# Patient Record
Sex: FEMALE | Race: WHITE | URBAN - METROPOLITAN AREA
[De-identification: names, ages, dates, MRNs, and addresses within clinical notes are randomized per-mention and may not be internally consistent; named-entity substitution may affect disease eponyms.]

---

## 2020-08-06 ENCOUNTER — APPOINTMENT (RX ONLY)
Dept: URBAN - METROPOLITAN AREA CLINIC 40 | Facility: CLINIC | Age: 70
Setting detail: DERMATOLOGY
End: 2020-08-06

## 2020-08-06 DIAGNOSIS — L82.0 INFLAMED SEBORRHEIC KERATOSIS: ICD-10-CM

## 2020-08-06 PROCEDURE — ? LIQUID NITROGEN

## 2020-08-06 PROCEDURE — ? COUNSELING

## 2020-08-06 PROCEDURE — 17110 DESTRUCTION B9 LES UP TO 14: CPT

## 2020-08-06 PROCEDURE — ? ADDITIONAL NOTES

## 2020-08-06 ASSESSMENT — LOCATION ZONE DERM: LOCATION ZONE: SCALP

## 2020-08-06 ASSESSMENT — LOCATION DETAILED DESCRIPTION DERM: LOCATION DETAILED: LEFT CENTRAL FRONTAL SCALP

## 2020-08-06 ASSESSMENT — LOCATION SIMPLE DESCRIPTION DERM: LOCATION SIMPLE: SCALP

## 2024-06-06 ENCOUNTER — OFFICE (OUTPATIENT)
Dept: URBAN - METROPOLITAN AREA CLINIC 79 | Facility: CLINIC | Age: 74
End: 2024-06-06
Payer: MEDICARE

## 2024-06-06 VITALS
WEIGHT: 192 LBS | SYSTOLIC BLOOD PRESSURE: 130 MMHG | HEIGHT: 59 IN | DIASTOLIC BLOOD PRESSURE: 100 MMHG | HEART RATE: 92 BPM | TEMPERATURE: 97.6 F

## 2024-06-06 DIAGNOSIS — R15.0 INCOMPLETE DEFECATION: ICD-10-CM

## 2024-06-06 DIAGNOSIS — K62.5 HEMORRHAGE OF ANUS AND RECTUM: ICD-10-CM

## 2024-06-06 DIAGNOSIS — R19.8 OTHER SPECIFIED SYMPTOMS AND SIGNS INVOLVING THE DIGESTIVE S: ICD-10-CM

## 2024-06-06 DIAGNOSIS — R13.10 DYSPHAGIA, UNSPECIFIED: ICD-10-CM

## 2024-06-06 PROCEDURE — 99204 OFFICE O/P NEW MOD 45 MIN: CPT | Performed by: INTERNAL MEDICINE

## 2024-06-06 NOTE — SERVICEHPINOTES
SASCHA ZELAYA   is a   74   year old    female who is being seen in consultation at the request of   NITISH MCARTHUR   for trouble with bowel movements. She has a history of hysterectomy in her 30s, bladder surgery (pelvic sling) in her 40s, and pelvic floor support surgery for prolapse of rectum (done by colorectal surgery)  She had a mesh put in at that time to keep her bowels in place. She has been having issues defecating. She notes sensation to defecate but then has trouble getting stool out. She notes BSS type 1 frequently. She can note long thin stools at times as well. She notes bladder leakage frequently. She can note stool leakage at times. She denies any blood in stool but notes blood on toilet paper at times. She attributes this to hemorrhoids (since her pregnancy). She had a CT which revealed colonic diverticulosis and very small hiatal hernia.  She denies any melena, She denies any nausea or vomiting. She can note occasional mild LLQ discomfort at times. She denies loss of appetite or weight loss. She denies any reflux but can note occasional dysphagia. No family history of colon cancer.  She had a flex sig in her 30s. She had two colonoscopies around time of her pelvic floor support surgery (done by [facility]). No history of colon polyps. 
br
br Of note she has a history of occasional heartbeat irregularity but has seen cardiology twice with no significant findings. Her shortness of breath on exertion is usually if she has to walk faster to keep up with somebody else. There has been no change in this. She does have a history of sleep apnea.

## 2024-06-06 NOTE — SERVICENOTES
Patient understands and agrees with plan. Questions/concerns addressed.

I personally discussed the procedures with the patient, including the risks, benefits, and alternatives of EGD and Colonoscopy. The procedures will be done under pharmacologic sedation. Risks and potential complications of the procedures include, but not limited to: bleeding, infection, bowel perforation, adverse reaction to anesthetics and missed lesions. Biopsy, dilation, polypectomy and/or maneuvers to control bleeding may be performed.  Discussed the need for low fiber diet for a week before procedure and clear liquid diet the day before procedure. 
Discussed prep with Sutab or Miralax with Gatorade. Patient expressed understanding of all these risks and was given the opportunity to ask questions, which I answered to the patient’s satisfaction. The patient agreed to the procedures.

## 2024-07-22 ENCOUNTER — OFFICE (OUTPATIENT)
Dept: URBAN - METROPOLITAN AREA PATHOLOGY 3 | Facility: PATHOLOGY | Age: 74
End: 2024-07-22
Payer: MEDICARE

## 2024-07-22 ENCOUNTER — AMBULATORY SURGICAL CENTER (OUTPATIENT)
Dept: URBAN - METROPOLITAN AREA SURGERY 23 | Facility: SURGERY | Age: 74
End: 2024-07-22
Payer: MEDICARE

## 2024-07-22 DIAGNOSIS — K64.0 FIRST DEGREE HEMORRHOIDS: ICD-10-CM

## 2024-07-22 DIAGNOSIS — K31.89 OTHER DISEASES OF STOMACH AND DUODENUM: ICD-10-CM

## 2024-07-22 DIAGNOSIS — K44.9 DIAPHRAGMATIC HERNIA WITHOUT OBSTRUCTION OR GANGRENE: ICD-10-CM

## 2024-07-22 DIAGNOSIS — R13.10 DYSPHAGIA, UNSPECIFIED: ICD-10-CM

## 2024-07-22 DIAGNOSIS — K63.5 POLYP OF COLON: ICD-10-CM

## 2024-07-22 DIAGNOSIS — D12.0 BENIGN NEOPLASM OF CECUM: ICD-10-CM

## 2024-07-22 DIAGNOSIS — K57.30 DIVERTICULOSIS OF LARGE INTESTINE WITHOUT PERFORATION OR ABS: ICD-10-CM

## 2024-07-22 DIAGNOSIS — R15.0 INCOMPLETE DEFECATION: ICD-10-CM

## 2024-07-22 PROCEDURE — 88305 TISSUE EXAM BY PATHOLOGIST: CPT | Performed by: PATHOLOGY

## 2024-07-22 PROCEDURE — 88312 SPECIAL STAINS GROUP 1: CPT | Performed by: PATHOLOGY

## 2024-07-22 PROCEDURE — 88313 SPECIAL STAINS GROUP 2: CPT | Performed by: PATHOLOGY

## 2024-07-22 PROCEDURE — 43239 EGD BIOPSY SINGLE/MULTIPLE: CPT | Performed by: INTERNAL MEDICINE

## 2024-07-22 PROCEDURE — 45380 COLONOSCOPY AND BIOPSY: CPT | Performed by: INTERNAL MEDICINE

## 2024-12-16 ENCOUNTER — TELEHEALTH PROVIDED OTHER THAN IN PATIENT'S HOME (OUTPATIENT)
Dept: URBAN - METROPOLITAN AREA TELEHEALTH 7 | Facility: TELEHEALTH | Age: 74
End: 2024-12-16
Payer: MEDICARE

## 2024-12-16 VITALS — WEIGHT: 202 LBS | HEIGHT: 59 IN

## 2024-12-16 DIAGNOSIS — R19.8 OTHER SPECIFIED SYMPTOMS AND SIGNS INVOLVING THE DIGESTIVE S: ICD-10-CM

## 2024-12-16 DIAGNOSIS — R13.10 DYSPHAGIA, UNSPECIFIED: ICD-10-CM

## 2024-12-16 PROCEDURE — 99214 OFFICE O/P EST MOD 30 MIN: CPT | Mod: 95 | Performed by: INTERNAL MEDICINE

## 2024-12-16 RX ORDER — PANTOPRAZOLE 20 MG/1
TABLET, DELAYED RELEASE ORAL
Qty: 60 | Refills: 5 | Status: ACTIVE
Start: 2024-07-22

## 2024-12-16 NOTE — SERVICENOTES
Patient was located in their home during visit., Patient's visit was conducted through [employer] video telecommunication. Patient consented before the start of visit as to understanding of privacy concerns, possible technological failure, and their responsibility of carrying out instructions of plan., I spent 20 minutes today reviewing the chart, talking with the patient, reviewing previous results with patient, discussing plan, and documenting. Patient understands and agrees with plan. Questions/concerns addressed.

## 2024-12-16 NOTE — SERVICEHPINOTES
PATIENT VERIFIED BY DATE OF BIRTH AND NAME. Patient has been consented for this telecommunication visit using [employer] application.   SASCHA ZELAYA   is a   74  female who presents for follow up. She had recent EGD for occasional dysphagia which was largely unremarkable. Biopsies were normal. She has been asymptomatic on bid PPI. She denies any abdominal pain, nausea, vomiting, change in bowel habits, melena, rectal bleeding, loss of appetite, weight loss. She is planned for surgery (ventral retropexy) for her symptoms and pelvic MRI findings of rectorectal intussusception and small anterior rectocele. small sigmoidocele and peritoneocele. Also noted to have mild abnormal pelvic floor descent. She has also noted some urinary leakage and is seeing her urologist tomorrow. Pending evaluation there may be an indication for urology to perform surgery in conjunction with colorectal surgery.br
br  ROS as above, otherwise remainder of ROS is negative.